# Patient Record
Sex: FEMALE | Race: WHITE | NOT HISPANIC OR LATINO
[De-identification: names, ages, dates, MRNs, and addresses within clinical notes are randomized per-mention and may not be internally consistent; named-entity substitution may affect disease eponyms.]

---

## 2017-12-29 ENCOUNTER — HOSPITAL (OUTPATIENT)
Dept: OTHER | Age: 52
End: 2017-12-29
Attending: RADIOLOGY

## 2018-12-18 PROCEDURE — 88175 CYTOPATH C/V AUTO FLUID REDO: CPT | Performed by: INTERNAL MEDICINE

## 2019-05-22 PROCEDURE — 88305 TISSUE EXAM BY PATHOLOGIST: CPT | Performed by: OBSTETRICS & GYNECOLOGY

## 2020-10-26 ENCOUNTER — HOSPITAL ENCOUNTER (OUTPATIENT)
Age: 55
Discharge: HOME | End: 2020-10-26
Payer: COMMERCIAL

## 2020-10-26 VITALS
DIASTOLIC BLOOD PRESSURE: 71 MMHG | WEIGHT: 195 LBS | HEART RATE: 88 BPM | BODY MASS INDEX: 41 KG/M2 | TEMPERATURE: 98 F | SYSTOLIC BLOOD PRESSURE: 176 MMHG | OXYGEN SATURATION: 100 % | RESPIRATION RATE: 17 BRPM

## 2020-10-26 DIAGNOSIS — Z20.822 ENCOUNTER FOR LABORATORY TESTING FOR COVID-19 VIRUS: ICD-10-CM

## 2020-10-26 DIAGNOSIS — R09.81 SINUS CONGESTION: Primary | ICD-10-CM

## 2020-10-26 PROCEDURE — 99203 OFFICE O/P NEW LOW 30 MIN: CPT | Performed by: NURSE PRACTITIONER

## 2020-10-26 PROCEDURE — U0003 INFECTIOUS AGENT DETECTION BY NUCLEIC ACID (DNA OR RNA); SEVERE ACUTE RESPIRATORY SYNDROME CORONAVIRUS 2 (SARS-COV-2) (CORONAVIRUS DISEASE [COVID-19]), AMPLIFIED PROBE TECHNIQUE, MAKING USE OF HIGH THROUGHPUT TECHNOLOGIES AS DESCRIBED BY CMS-2020-01-R: HCPCS | Performed by: NURSE PRACTITIONER

## 2020-10-26 RX ORDER — DOXYCYCLINE HYCLATE 100 MG/1
100 CAPSULE ORAL 2 TIMES DAILY
Qty: 14 CAPSULE | Refills: 0 | Status: SHIPPED | OUTPATIENT
Start: 2020-10-26 | End: 2020-11-02

## 2020-10-27 NOTE — ED PROVIDER NOTES
Patient Seen in: [facility]    History   CC: sinus pressure  HPI: [de-identified] 53year old female  who presents c/o right sided sinus pressure and some congestion x2-3 days. No swelling. No fever. No sore throat.  No cough, sob, darlyn, loss amLODIPine Besylate 5 MG Oral Tab,  Take 1 tablet (5 mg total) by mouth daily. ALPRAZolam 0.5 MG Oral Tab,  Take 1 tablet (0.5 mg total) by mouth 2 (two) times daily as needed for Anxiety.    SUMAtriptan Succinate 50 MG Oral Tab,  Take 1 tablet (50 mg tot s/s at any point in the next 1-3 days, may initiate. Advised on sinus congestion and sinusitis ins including sinus rinses. Unable to take Flonase d/t steroid allergy per pt. Covid19 test pending. Will notify w/ results.  Generalized Covid19 and viral illnes

## 2021-01-30 ENCOUNTER — HOSPITAL ENCOUNTER (OUTPATIENT)
Age: 56
Discharge: HOME | End: 2021-01-30
Attending: PHYSICIAN ASSISTANT
Payer: COMMERCIAL

## 2021-01-30 VITALS
SYSTOLIC BLOOD PRESSURE: 159 MMHG | TEMPERATURE: 98 F | RESPIRATION RATE: 16 BRPM | HEART RATE: 83 BPM | OXYGEN SATURATION: 100 % | DIASTOLIC BLOOD PRESSURE: 70 MMHG

## 2021-01-30 DIAGNOSIS — R03.0 ELEVATED BLOOD PRESSURE READING: ICD-10-CM

## 2021-01-30 DIAGNOSIS — R21 RASH: Primary | ICD-10-CM

## 2021-01-30 PROCEDURE — 99203 OFFICE O/P NEW LOW 30 MIN: CPT | Performed by: PHYSICIAN ASSISTANT

## 2021-01-30 RX ORDER — DIPHENHYDRAMINE HCL 25 MG
CAPSULE ORAL
Qty: 40 CAPSULE | Refills: 0 | Status: SHIPPED | OUTPATIENT
Start: 2021-01-30 | End: 2021-02-04

## 2021-01-30 RX ORDER — FAMOTIDINE 20 MG/1
20 TABLET ORAL 2 TIMES DAILY
Qty: 10 TABLET | Refills: 0 | Status: SHIPPED | OUTPATIENT
Start: 2021-01-30 | End: 2021-02-15

## 2021-01-30 RX ORDER — EPINEPHRINE 0.3 MG/.3ML
0.3 INJECTION SUBCUTANEOUS
Qty: 1 EACH | Refills: 0 | Status: SHIPPED | OUTPATIENT
Start: 2021-01-30 | End: 2021-03-01

## 2021-01-30 RX ORDER — PREDNISONE 20 MG/1
20 TABLET ORAL DAILY
Qty: 5 TABLET | Refills: 0 | Status: SHIPPED | OUTPATIENT
Start: 2021-01-30 | End: 2021-02-15

## 2021-01-30 NOTE — ED INITIAL ASSESSMENT (HPI)
Pt states that she started breaking out in a rash 10 days ago after taking a bath with epsom salt and lavender. Has been taking Benadryl. Triamcinolone cream and Allegra as prescribed by her PCP since that time. Rash continues to spread on body.  Denies dif

## 2021-01-30 NOTE — ED PROVIDER NOTES
Patient Seen in: [facility]    History   Patient presents with:  Skin Problem    Stated Complaint: Allergic Reaction    HPI    Jasmin Kamla is a 63year old female who presents with chief complaint of rash. Onset 2 days ago.   Rash is times daily as needed. amLODIPine Besylate 5 MG Oral Tab,  Take 1 tablet (5 mg total) by mouth daily. ALPRAZolam 0.5 MG Oral Tab,  Take 1 tablet (0.5 mg total) by mouth 2 (two) times daily as needed for Anxiety.        Family History   Problem Relation angioedema. Neck: The neck is supple. There is no evidence of JVD. No meningeal signs. Chest: There is no tenderness to the chest wall. Respiratory: Respiratory effort was normal.  There is no rales, wheezes, or rhonchi. There is no stridor.   Air ent of their elevated blood pressure reading at immediate care. They were informed of the dangers of undiagnosed and untreated hypertension.   Education regarding lifestyle modifications and the need for appropriate follow-up with their PCP to have their blood

## 2021-02-10 PROCEDURE — 88305 TISSUE EXAM BY PATHOLOGIST: CPT | Performed by: OBSTETRICS & GYNECOLOGY

## 2021-08-03 PROBLEM — Z63.4 BEREAVEMENT: Status: ACTIVE | Noted: 2021-08-03

## 2021-08-03 PROBLEM — I10 ESSENTIAL HYPERTENSION: Status: ACTIVE | Noted: 2021-08-03

## 2021-08-03 PROBLEM — E66.01 OBESITY, MORBID (HCC): Status: ACTIVE | Noted: 2021-08-03

## 2021-11-03 NOTE — H&P (VIEW-ONLY)
Pre-Operative History and Physical    Diagnosis:postmenopausal bleeding    Planned Procedure: hysterectomy, D&C     HPI:  Marie Clark is a 61year old  Patient's last menstrual period was 2019 (approximate).  who presents for evaluation o No        Sleep Concern: No        Stress Concern: No        Weight Concern: Yes        Special Diet: Yes        Back Care: Not Asked        Exercise: Yes          treadmill 3-4x/week 30-45 min        Bike Helmet: Not Asked        Seat Belt: Yes        Ruth pain, diarrhea or constiptation  Genitourinary: negative; denies dysuria, incontinence, vaginal itching or discharge. Musculoskeletal: negative; denies back pain. Skin/Breast: negative; Denies any breast pain, lumps, or discharge.   Denies any skin lesi

## 2021-11-17 ENCOUNTER — LAB ENCOUNTER (OUTPATIENT)
Dept: LAB | Facility: HOSPITAL | Age: 56
End: 2021-11-17
Attending: OBSTETRICS & GYNECOLOGY
Payer: COMMERCIAL

## 2021-11-17 DIAGNOSIS — Z01.818 PREOPERATIVE TESTING: ICD-10-CM

## 2021-11-19 ENCOUNTER — HOSPITAL ENCOUNTER (OUTPATIENT)
Facility: HOSPITAL | Age: 56
Setting detail: HOSPITAL OUTPATIENT SURGERY
Discharge: HOME | End: 2021-11-19
Attending: OBSTETRICS & GYNECOLOGY | Admitting: OBSTETRICS & GYNECOLOGY
Payer: COMMERCIAL

## 2021-11-19 ENCOUNTER — ANESTHESIA EVENT (OUTPATIENT)
Dept: SURGERY | Facility: HOSPITAL | Age: 56
End: 2021-11-19
Payer: COMMERCIAL

## 2021-11-19 ENCOUNTER — ANESTHESIA (OUTPATIENT)
Dept: SURGERY | Facility: HOSPITAL | Age: 56
End: 2021-11-19
Payer: COMMERCIAL

## 2021-11-19 VITALS
DIASTOLIC BLOOD PRESSURE: 54 MMHG | TEMPERATURE: 98 F | WEIGHT: 212 LBS | SYSTOLIC BLOOD PRESSURE: 113 MMHG | RESPIRATION RATE: 18 BRPM | HEIGHT: 57.5 IN | OXYGEN SATURATION: 99 % | HEART RATE: 50 BPM | BODY MASS INDEX: 45.11 KG/M2

## 2021-11-19 DIAGNOSIS — Z01.818 PREOPERATIVE TESTING: Primary | ICD-10-CM

## 2021-11-19 PROCEDURE — 88305 TISSUE EXAM BY PATHOLOGIST: CPT | Performed by: OBSTETRICS & GYNECOLOGY

## 2021-11-19 PROCEDURE — 0UDB7ZX EXTRACTION OF ENDOMETRIUM, VIA NATURAL OR ARTIFICIAL OPENING, DIAGNOSTIC: ICD-10-PCS | Performed by: OBSTETRICS & GYNECOLOGY

## 2021-11-19 PROCEDURE — 0UJD8ZZ INSPECTION OF UTERUS AND CERVIX, VIA NATURAL OR ARTIFICIAL OPENING ENDOSCOPIC: ICD-10-PCS | Performed by: OBSTETRICS & GYNECOLOGY

## 2021-11-19 RX ORDER — NALOXONE HYDROCHLORIDE 0.4 MG/ML
80 INJECTION, SOLUTION INTRAMUSCULAR; INTRAVENOUS; SUBCUTANEOUS AS NEEDED
Status: DISCONTINUED | OUTPATIENT
Start: 2021-11-19 | End: 2021-11-19

## 2021-11-19 RX ORDER — HALOPERIDOL 5 MG/ML
0.25 INJECTION INTRAMUSCULAR ONCE AS NEEDED
Status: DISCONTINUED | OUTPATIENT
Start: 2021-11-19 | End: 2021-11-19

## 2021-11-19 RX ORDER — MORPHINE SULFATE 10 MG/ML
6 INJECTION, SOLUTION INTRAMUSCULAR; INTRAVENOUS EVERY 10 MIN PRN
Status: DISCONTINUED | OUTPATIENT
Start: 2021-11-19 | End: 2021-11-19

## 2021-11-19 RX ORDER — ONDANSETRON 2 MG/ML
INJECTION INTRAMUSCULAR; INTRAVENOUS AS NEEDED
Status: DISCONTINUED | OUTPATIENT
Start: 2021-11-19 | End: 2021-11-19 | Stop reason: SURG

## 2021-11-19 RX ORDER — PROCHLORPERAZINE EDISYLATE 5 MG/ML
5 INJECTION INTRAMUSCULAR; INTRAVENOUS ONCE AS NEEDED
Status: DISCONTINUED | OUTPATIENT
Start: 2021-11-19 | End: 2021-11-19

## 2021-11-19 RX ORDER — ONDANSETRON 2 MG/ML
4 INJECTION INTRAMUSCULAR; INTRAVENOUS EVERY 8 HOURS PRN
Status: DISCONTINUED | OUTPATIENT
Start: 2021-11-19 | End: 2021-11-19

## 2021-11-19 RX ORDER — ONDANSETRON 2 MG/ML
4 INJECTION INTRAMUSCULAR; INTRAVENOUS ONCE AS NEEDED
Status: DISCONTINUED | OUTPATIENT
Start: 2021-11-19 | End: 2021-11-19

## 2021-11-19 RX ORDER — ACETAMINOPHEN 325 MG/1
650 TABLET ORAL EVERY 6 HOURS PRN
Status: DISCONTINUED | OUTPATIENT
Start: 2021-11-19 | End: 2021-11-19

## 2021-11-19 RX ORDER — ONDANSETRON 4 MG/1
4 TABLET, FILM COATED ORAL EVERY 8 HOURS PRN
Status: DISCONTINUED | OUTPATIENT
Start: 2021-11-19 | End: 2021-11-19

## 2021-11-19 RX ORDER — HYDROMORPHONE HYDROCHLORIDE 1 MG/ML
0.2 INJECTION, SOLUTION INTRAMUSCULAR; INTRAVENOUS; SUBCUTANEOUS EVERY 5 MIN PRN
Status: DISCONTINUED | OUTPATIENT
Start: 2021-11-19 | End: 2021-11-19

## 2021-11-19 RX ORDER — METOCLOPRAMIDE 10 MG/1
10 TABLET ORAL ONCE
Status: COMPLETED | OUTPATIENT
Start: 2021-11-19 | End: 2021-11-19

## 2021-11-19 RX ORDER — HYDROMORPHONE HYDROCHLORIDE 1 MG/ML
0.6 INJECTION, SOLUTION INTRAMUSCULAR; INTRAVENOUS; SUBCUTANEOUS EVERY 5 MIN PRN
Status: DISCONTINUED | OUTPATIENT
Start: 2021-11-19 | End: 2021-11-19

## 2021-11-19 RX ORDER — ACETAMINOPHEN 500 MG
1000 TABLET ORAL ONCE
Status: COMPLETED | OUTPATIENT
Start: 2021-11-19 | End: 2021-11-19

## 2021-11-19 RX ORDER — MORPHINE SULFATE 4 MG/ML
2 INJECTION, SOLUTION INTRAMUSCULAR; INTRAVENOUS EVERY 10 MIN PRN
Status: DISCONTINUED | OUTPATIENT
Start: 2021-11-19 | End: 2021-11-19

## 2021-11-19 RX ORDER — HYDROCODONE BITARTRATE AND ACETAMINOPHEN 5; 325 MG/1; MG/1
2 TABLET ORAL AS NEEDED
Status: DISCONTINUED | OUTPATIENT
Start: 2021-11-19 | End: 2021-11-19

## 2021-11-19 RX ORDER — FAMOTIDINE 20 MG/1
20 TABLET ORAL ONCE
Status: COMPLETED | OUTPATIENT
Start: 2021-11-19 | End: 2021-11-19

## 2021-11-19 RX ORDER — MORPHINE SULFATE 4 MG/ML
4 INJECTION, SOLUTION INTRAMUSCULAR; INTRAVENOUS EVERY 10 MIN PRN
Status: DISCONTINUED | OUTPATIENT
Start: 2021-11-19 | End: 2021-11-19

## 2021-11-19 RX ORDER — SODIUM CHLORIDE, SODIUM LACTATE, POTASSIUM CHLORIDE, CALCIUM CHLORIDE 600; 310; 30; 20 MG/100ML; MG/100ML; MG/100ML; MG/100ML
INJECTION, SOLUTION INTRAVENOUS CONTINUOUS
Status: DISCONTINUED | OUTPATIENT
Start: 2021-11-19 | End: 2021-11-19

## 2021-11-19 RX ORDER — HYDROCODONE BITARTRATE AND ACETAMINOPHEN 5; 325 MG/1; MG/1
2 TABLET ORAL EVERY 6 HOURS PRN
Status: DISCONTINUED | OUTPATIENT
Start: 2021-11-19 | End: 2021-11-19

## 2021-11-19 RX ORDER — HYDROCODONE BITARTRATE AND ACETAMINOPHEN 5; 325 MG/1; MG/1
1 TABLET ORAL AS NEEDED
Status: DISCONTINUED | OUTPATIENT
Start: 2021-11-19 | End: 2021-11-19

## 2021-11-19 RX ORDER — HYDROCODONE BITARTRATE AND ACETAMINOPHEN 5; 325 MG/1; MG/1
1 TABLET ORAL EVERY 6 HOURS PRN
Status: DISCONTINUED | OUTPATIENT
Start: 2021-11-19 | End: 2021-11-19

## 2021-11-19 RX ORDER — HYDROMORPHONE HYDROCHLORIDE 1 MG/ML
0.4 INJECTION, SOLUTION INTRAMUSCULAR; INTRAVENOUS; SUBCUTANEOUS EVERY 5 MIN PRN
Status: DISCONTINUED | OUTPATIENT
Start: 2021-11-19 | End: 2021-11-19

## 2021-11-19 RX ORDER — IBUPROFEN 600 MG/1
600 TABLET ORAL EVERY 6 HOURS
Status: DISCONTINUED | OUTPATIENT
Start: 2021-11-19 | End: 2021-11-19

## 2021-11-19 RX ADMIN — SODIUM CHLORIDE, SODIUM LACTATE, POTASSIUM CHLORIDE, CALCIUM CHLORIDE: 600; 310; 30; 20 INJECTION, SOLUTION INTRAVENOUS at 14:25:00

## 2021-11-19 RX ADMIN — ONDANSETRON 4 MG: 2 INJECTION INTRAMUSCULAR; INTRAVENOUS at 14:07:00

## 2021-11-19 NOTE — ANESTHESIA PREPROCEDURE EVALUATION
Anesthesia PreOp Note    HPI:     Adam Chattereje is a 61year old female who presents for preoperative consultation requested by: Richard Monzon MD    Date of Surgery: 11/19/2021    Procedure(s):  hysteroscopy, dilation and curettage  Indication: post besylate 5 MG Oral Tab, Take 1 tablet (5 mg total) by mouth daily. , Disp: 90 tablet, Rfl: 3, 11/19/2021 at Unknown time      lactated ringers infusion, , Intravenous, Continuous, Mitchel ELLIS MD, Last Rate: 20 mL/hr at 11/19/21 1237, New Bag at 11/19/2 Seat Belt: Yes        Self-Exams: Yes    Social History Narrative      Lives with  and 2 girls     Social Determinants of Health  Financial Resource Strain: Not on file  Food Insecurity: Not on file  Transportation Needs: Not on file  Physical Ac normal; there are no regional wall motion abnormalities.      Left ventricular diastolic function parameters are normal.     Impressions:     - Normal study.   - No previous study was available for comparison.      Anesthesia Plan:   ASA:  3  Plan:   Stephanie Mora

## 2021-11-19 NOTE — DISCHARGE SUMMARY
[facility] HOSP - [facility]    Discharge Summary    [facility] Patient Status:  Hospital Outpatient Surgery    10/29/1965 MRN [id number]   Location [facility] 45 Attending Alicia Monique MD   Hosp Day # 0 PCP Caterina Bowman, · That you have someone stay with you on your first night home   · Do not drink alcohol or take sleeping pills or tranquilizers   · Do not sign legal documents within 24 hours of your procedure   · If you had a nerve block for your surgery, take extra ca

## 2021-11-19 NOTE — ANESTHESIA POSTPROCEDURE EVALUATION
Patient: Mik Graham    Procedure Summary     Date: 11/19/21 Room / Location: [address] [place] [address]    Anesthesia Start: 9973 Anesthesia Stop: 5587    Procedure: hysteroscopy, dilation and curettage (N/A ) Diagnosis: (post menopausal bleeding)

## 2021-11-19 NOTE — BRIEF OP NOTE
Pre-Operative Diagnosis: post menopausal bleeding     Post-Operative Diagnosis: post menopausal bleeding      Procedure Performed:   hysteroscopy, dilation and curettage    Surgeon(s) and Role:     Clifton Erickson MD - Primary    Assistant(s):        Gaby Damian

## 2021-11-19 NOTE — INTERVAL H&P NOTE
Pre-op Diagnosis: post menopausal bleeding    The above referenced H&P was reviewed by Arelis Nieves MD on 11/19/2021, the patient was examined and no significant changes have occurred in the patient's condition since the H&P was performed.   I discussed

## 2021-11-19 NOTE — ANESTHESIA PROCEDURE NOTES
Airway  Date/Time: 11/19/2021 1:54 PM  Urgency: Elective      General Information and Staff    Patient location during procedure: OR  Anesthesiologist: Maximo Bucio MD  Performed: anesthesiologist     Indications and Patient Condition  Indicati

## 2021-11-19 NOTE — OPERATIVE REPORT
Pre-Operative Diagnosis: post menopausal bleeding     Post-Operative Diagnosis: post menopausal bleeding      Procedure Performed:   hysteroscopy, dilation and curettage    Surgeon(s) and Role:     Sherman Vergara MD - Primary    Assistant(s):        Nona Pizano

## 2023-01-10 ENCOUNTER — HOSPITAL ENCOUNTER (OUTPATIENT)
Age: 58
Discharge: HOME | End: 2023-01-10
Payer: COMMERCIAL

## 2023-01-10 VITALS
TEMPERATURE: 98 F | RESPIRATION RATE: 20 BRPM | DIASTOLIC BLOOD PRESSURE: 68 MMHG | HEART RATE: 82 BPM | SYSTOLIC BLOOD PRESSURE: 148 MMHG | OXYGEN SATURATION: 98 %

## 2023-01-10 DIAGNOSIS — J01.90 ACUTE SINUSITIS, RECURRENCE NOT SPECIFIED, UNSPECIFIED LOCATION: Primary | ICD-10-CM

## 2023-01-10 PROCEDURE — 99213 OFFICE O/P EST LOW 20 MIN: CPT | Performed by: NURSE PRACTITIONER

## 2023-01-10 NOTE — ED INITIAL ASSESSMENT (HPI)
Patient is here with \"nasal congestion and head pressure\" that she states has been going on for a least a week and a half. She has been on two antibiotics for the same. Three covid tests were taken and all negative.

## 2023-08-23 ENCOUNTER — TELEPHONE (OUTPATIENT)
Dept: OTHER | Facility: HOSPITAL | Age: 58
End: 2023-08-23

## 2023-08-23 NOTE — PROGRESS NOTES
Patient is schedule for the Heart Scan on 8/30 at [facility].  Patient completed the Heart Scan on 10/28/2021 with [facility]. And her calcium score was 0.0. LVM to discuss with patient as to reason for repeating so soon. Left our RN line to call back.

## 2023-11-15 ENCOUNTER — HOSPITAL ENCOUNTER (OUTPATIENT)
Age: 58
Discharge: HOME | End: 2023-11-15
Payer: COMMERCIAL

## 2023-11-15 VITALS
RESPIRATION RATE: 18 BRPM | OXYGEN SATURATION: 97 % | TEMPERATURE: 100 F | HEART RATE: 93 BPM | DIASTOLIC BLOOD PRESSURE: 83 MMHG | SYSTOLIC BLOOD PRESSURE: 153 MMHG

## 2023-11-15 DIAGNOSIS — L03.114 CELLULITIS OF LEFT UPPER EXTREMITY: Primary | ICD-10-CM

## 2023-11-15 DIAGNOSIS — R21 RASH: ICD-10-CM

## 2023-11-15 LAB — S PYO AG THROAT QL: NEGATIVE

## 2023-11-15 PROCEDURE — 99213 OFFICE O/P EST LOW 20 MIN: CPT | Performed by: EMERGENCY MEDICINE

## 2023-11-15 PROCEDURE — 87880 STREP A ASSAY W/OPTIC: CPT | Performed by: EMERGENCY MEDICINE

## 2023-11-15 RX ORDER — KETOCONAZOLE 20 MG/G
CREAM TOPICAL
Qty: 30 G | Refills: 0 | Status: SHIPPED | OUTPATIENT
Start: 2023-11-15

## 2023-11-15 RX ORDER — CEPHALEXIN 500 MG/1
500 CAPSULE ORAL 2 TIMES DAILY
Qty: 14 CAPSULE | Refills: 0 | Status: SHIPPED | OUTPATIENT
Start: 2023-11-15 | End: 2023-11-22

## 2023-11-15 NOTE — DISCHARGE INSTRUCTIONS
As we discussed your symptoms are consistent with cellulitis, but the other patch area can be dermatitis related to the vaccine, or ringworm. Ketoconazole was sent to the pharmacy to take as directed. Keflex antibiotic was sent to the pharmacy to help treat with the mild cellulitis. Upper respiratory symptoms as we discussed are likely viral, and there is no evidence of ear infection. In addition to the current regimen please take a 24-hour Zyrtec/cetirizine, or Xyzal/levocetirizine during the day, and 25 to 50 mg of Benadryl at nighttime. Cool compress to the left arm, and left armpit. There is no contraindication of taking ibuprofen type medications you can take ibuprofen every 6-8 hours as needed for inflammation. This is the same medication as Motrin/Advil  Call your primary for not better within the next 7 to 10 days.   Go to the emergency room for any worsening symptoms

## 2023-11-15 NOTE — ED INITIAL ASSESSMENT (HPI)
Pt states received tetanus 6 days ago to L arm. Pt c/o L arm pain, swelling, redness and itching to injection site and L axilla pain x6 days. Pt adds c/o sore thorat, headache, B ear pain, cough x4 days. Tmax 100.4 last night.

## 2023-11-17 ENCOUNTER — HOSPITAL ENCOUNTER (OUTPATIENT)
Age: 58
Discharge: HOME | End: 2023-11-17
Payer: COMMERCIAL

## 2023-11-17 ENCOUNTER — APPOINTMENT (OUTPATIENT)
Dept: GENERAL RADIOLOGY | Age: 58
End: 2023-11-17
Attending: PHYSICIAN ASSISTANT
Payer: COMMERCIAL

## 2023-11-17 VITALS
SYSTOLIC BLOOD PRESSURE: 154 MMHG | OXYGEN SATURATION: 97 % | DIASTOLIC BLOOD PRESSURE: 76 MMHG | HEART RATE: 88 BPM | RESPIRATION RATE: 16 BRPM | TEMPERATURE: 100 F

## 2023-11-17 DIAGNOSIS — R50.9 FEVER, UNSPECIFIED FEVER CAUSE: Primary | ICD-10-CM

## 2023-11-17 DIAGNOSIS — J18.9 PNEUMONIA OF RIGHT UPPER LOBE DUE TO INFECTIOUS ORGANISM: ICD-10-CM

## 2023-11-17 PROCEDURE — 99204 OFFICE O/P NEW MOD 45 MIN: CPT | Performed by: PHYSICIAN ASSISTANT

## 2023-11-17 PROCEDURE — 71046 X-RAY EXAM CHEST 2 VIEWS: CPT | Performed by: PHYSICIAN ASSISTANT

## 2023-11-17 RX ORDER — AZITHROMYCIN 250 MG/1
TABLET, FILM COATED ORAL
Qty: 6 TABLET | Refills: 0 | Status: SHIPPED | OUTPATIENT
Start: 2023-11-17 | End: 2023-11-22

## 2023-11-17 RX ORDER — CEFPODOXIME PROXETIL 200 MG/1
200 TABLET, FILM COATED ORAL 2 TIMES DAILY
Qty: 14 TABLET | Refills: 0 | Status: SHIPPED | OUTPATIENT
Start: 2023-11-17 | End: 2023-11-24

## 2023-11-17 RX ORDER — ALBUTEROL SULFATE 90 UG/1
2 AEROSOL, METERED RESPIRATORY (INHALATION) EVERY 4 HOURS PRN
Qty: 1 EACH | Refills: 0 | Status: SHIPPED | OUTPATIENT
Start: 2023-11-17 | End: 2023-12-17

## 2023-11-17 NOTE — ED INITIAL ASSESSMENT (HPI)
Patient comes in states she was here on Tuesday swab for covid at home was neg, and strep her was neg. Here again because continues to have a low grade fever 99.0's, feels like she has to clear her throat with coughing, ears feel like they are burning, fatigue.  [id number]

## 2024-01-29 ENCOUNTER — HOSPITAL ENCOUNTER (OUTPATIENT)
Age: 59
Discharge: ANOTHER HOSPITAL | End: 2024-01-29
Payer: COMMERCIAL

## 2024-01-29 ENCOUNTER — APPOINTMENT (OUTPATIENT)
Dept: GENERAL RADIOLOGY | Age: 59
End: 2024-01-29
Attending: NURSE PRACTITIONER
Payer: COMMERCIAL

## 2024-01-29 ENCOUNTER — HOSPITAL ENCOUNTER (EMERGENCY)
Facility: HOSPITAL | Age: 59
Discharge: HOME | End: 2024-01-29
Attending: EMERGENCY MEDICINE
Payer: COMMERCIAL

## 2024-01-29 VITALS
TEMPERATURE: 98 F | RESPIRATION RATE: 22 BRPM | OXYGEN SATURATION: 98 % | SYSTOLIC BLOOD PRESSURE: 121 MMHG | HEART RATE: 69 BPM | BODY MASS INDEX: 42.33 KG/M2 | WEIGHT: 210 LBS | DIASTOLIC BLOOD PRESSURE: 55 MMHG | HEIGHT: 59 IN

## 2024-01-29 VITALS
HEART RATE: 86 BPM | OXYGEN SATURATION: 100 % | RESPIRATION RATE: 18 BRPM | DIASTOLIC BLOOD PRESSURE: 67 MMHG | SYSTOLIC BLOOD PRESSURE: 146 MMHG | TEMPERATURE: 98 F

## 2024-01-29 DIAGNOSIS — S29.012A STRAIN OF THORACIC BACK REGION: Primary | ICD-10-CM

## 2024-01-29 DIAGNOSIS — M54.9 UPPER BACK PAIN ON LEFT SIDE: Primary | ICD-10-CM

## 2024-01-29 DIAGNOSIS — K30 INDIGESTION: ICD-10-CM

## 2024-01-29 DIAGNOSIS — R79.89 ELEVATED TROPONIN: ICD-10-CM

## 2024-01-29 LAB
#MXD IC: 0.9 X10ˆ3/UL (ref 0.1–1)
ANION GAP SERPL CALC-SCNC: 5 MMOL/L (ref 0–18)
ATRIAL RATE: 77 BPM
BASOPHILS # BLD AUTO: 0.06 X10(3) UL (ref 0–0.2)
BASOPHILS # BLD AUTO: 0.08 X10(3) UL (ref 0–0.2)
BASOPHILS NFR BLD AUTO: 0.6 %
BASOPHILS NFR BLD AUTO: 0.8 %
BUN BLD-MCNC: 13 MG/DL (ref 9–23)
BUN BLD-MCNC: 16 MG/DL (ref 7–18)
BUN/CREAT SERPL: 15.3 (ref 10–20)
CALCIUM BLD-MCNC: 9.2 MG/DL (ref 8.7–10.4)
CHLORIDE BLD-SCNC: 108 MMOL/L (ref 98–112)
CHLORIDE SERPL-SCNC: 109 MMOL/L (ref 98–112)
CO2 BLD-SCNC: 24 MMOL/L (ref 21–32)
CO2 SERPL-SCNC: 28 MMOL/L (ref 21–32)
CREAT BLD-MCNC: 0.8 MG/DL
CREAT BLD-MCNC: 0.85 MG/DL
DDIMER WHOLE BLOOD: <200 NG/ML DDU (ref ?–400)
DEPRECATED RDW RBC AUTO: 45.1 FL (ref 35.1–46.3)
DEPRECATED RDW RBC AUTO: 47.9 FL (ref 35.1–46.3)
EGFRCR SERPLBLD CKD-EPI 2021: 79 ML/MIN/1.73M2 (ref 60–?)
EGFRCR SERPLBLD CKD-EPI 2021: 85 ML/MIN/1.73M2 (ref 60–?)
EOSINOPHIL # BLD AUTO: 0.3 X10(3) UL (ref 0–0.7)
EOSINOPHIL # BLD AUTO: 0.32 X10(3) UL (ref 0–0.7)
EOSINOPHIL NFR BLD AUTO: 3.1 %
EOSINOPHIL NFR BLD AUTO: 3.2 %
ERYTHROCYTE [DISTWIDTH] IN BLOOD BY AUTOMATED COUNT: 13.8 % (ref 11–15)
ERYTHROCYTE [DISTWIDTH] IN BLOOD BY AUTOMATED COUNT: 13.8 % (ref 11–15)
GLUCOSE BLD-MCNC: 116 MG/DL (ref 70–99)
GLUCOSE BLD-MCNC: 91 MG/DL (ref 70–99)
HCT VFR BLD AUTO: 38.4 %
HCT VFR BLD AUTO: 40.7 %
HCT VFR BLD AUTO: 41.6 %
HCT VFR BLD CALC: 41 %
HGB BLD-MCNC: 12.1 G/DL
HGB BLD-MCNC: 12.5 G/DL
HGB BLD-MCNC: 13.2 G/DL
IMM GRANULOCYTES # BLD AUTO: 0.02 X10(3) UL (ref 0–1)
IMM GRANULOCYTES # BLD AUTO: 0.03 X10(3) UL (ref 0–1)
IMM GRANULOCYTES NFR BLD: 0.2 %
IMM GRANULOCYTES NFR BLD: 0.3 %
ISTAT IONIZED CALCIUM FOR CHEM 8: 0.94 MMOL/L (ref 1.12–1.32)
LYMPHOCYTES # BLD AUTO: 3.88 X10(3) UL (ref 1–4)
LYMPHOCYTES # BLD AUTO: 4.12 X10(3) UL (ref 1–4)
LYMPHOCYTES # BLD AUTO: 4.7 X10ˆ3/UL (ref 1–4)
LYMPHOCYTES NFR BLD AUTO: 39.5 %
LYMPHOCYTES NFR BLD AUTO: 41 %
LYMPHOCYTES NFR BLD AUTO: 41.7 %
MCH RBC QN AUTO: 28.1 PG (ref 26–34)
MCH RBC QN AUTO: 28.2 PG (ref 26–34)
MCH RBC QN AUTO: 28.4 PG (ref 26–34)
MCHC RBC AUTO-ENTMCNC: 30 G/DL (ref 31–37)
MCHC RBC AUTO-ENTMCNC: 31.5 G/DL (ref 31–37)
MCHC RBC AUTO-ENTMCNC: 32.4 G/DL (ref 31–37)
MCV RBC AUTO: 87.7 FL (ref 80–100)
MCV RBC AUTO: 89.5 FL
MCV RBC AUTO: 93.5 FL
MIXED CELL %: 7.6 %
MONOCYTES # BLD AUTO: 0.68 X10(3) UL (ref 0.1–1)
MONOCYTES # BLD AUTO: 0.84 X10(3) UL (ref 0.1–1)
MONOCYTES NFR BLD AUTO: 6.8 %
MONOCYTES NFR BLD AUTO: 8.6 %
NEUTROPHILS # BLD AUTO: 4.72 X10 (3) UL (ref 1.5–7.7)
NEUTROPHILS # BLD AUTO: 4.72 X10(3) UL (ref 1.5–7.7)
NEUTROPHILS # BLD AUTO: 4.81 X10 (3) UL (ref 1.5–7.7)
NEUTROPHILS # BLD AUTO: 4.81 X10(3) UL (ref 1.5–7.7)
NEUTROPHILS # BLD AUTO: 5.6 X10ˆ3/UL (ref 1.5–7.7)
NEUTROPHILS NFR BLD AUTO: 47.9 %
NEUTROPHILS NFR BLD AUTO: 48 %
NEUTROPHILS NFR BLD AUTO: 50.7 %
OSMOLALITY SERPL CALC.SUM OF ELEC: 294 MOSM/KG (ref 275–295)
P AXIS: 56 DEGREES
P-R INTERVAL: 190 MS
PLATELET # BLD AUTO: 183 10(3)UL (ref 150–450)
PLATELET # BLD AUTO: 375 10(3)UL (ref 150–450)
POTASSIUM BLD-SCNC: 4.7 MMOL/L (ref 3.6–5.1)
POTASSIUM SERPL-SCNC: 3.5 MMOL/L (ref 3.5–5.1)
Q-T INTERVAL: 396 MS
QRS DURATION: 88 MS
QTC CALCULATION (BEZET): 448 MS
R AXIS: 48 DEGREES
RBC # BLD AUTO: 4.29 X10(6)UL
RBC # BLD AUTO: 4.45 X10(6)UL
RBC # BLD AUTO: 4.64 X10ˆ6/UL
SODIUM BLD-SCNC: 140 MMOL/L (ref 136–145)
SODIUM SERPL-SCNC: 142 MMOL/L (ref 136–145)
T AXIS: 45 DEGREES
TROPONIN I BLD-MCNC: 0.08 NG/ML
TROPONIN I SERPL HS-MCNC: 31 NG/L
VENTRICULAR RATE: 77 BPM
WBC # BLD AUTO: 10 X10(3) UL (ref 4–11)
WBC # BLD AUTO: 11.2 X10ˆ3/UL (ref 4–11)
WBC # BLD AUTO: 9.8 X10(3) UL (ref 4–11)

## 2024-01-29 PROCEDURE — 93000 ELECTROCARDIOGRAM COMPLETE: CPT | Performed by: NURSE PRACTITIONER

## 2024-01-29 PROCEDURE — 99284 EMERGENCY DEPT VISIT MOD MDM: CPT

## 2024-01-29 PROCEDURE — 80048 BASIC METABOLIC PNL TOTAL CA: CPT | Performed by: EMERGENCY MEDICINE

## 2024-01-29 PROCEDURE — 93005 ELECTROCARDIOGRAM TRACING: CPT

## 2024-01-29 PROCEDURE — 84484 ASSAY OF TROPONIN QUANT: CPT | Performed by: NURSE PRACTITIONER

## 2024-01-29 PROCEDURE — 80047 BASIC METABLC PNL IONIZED CA: CPT | Performed by: NURSE PRACTITIONER

## 2024-01-29 PROCEDURE — 71046 X-RAY EXAM CHEST 2 VIEWS: CPT | Performed by: NURSE PRACTITIONER

## 2024-01-29 PROCEDURE — 85025 COMPLETE CBC W/AUTO DIFF WBC: CPT | Performed by: NURSE PRACTITIONER

## 2024-01-29 PROCEDURE — 99214 OFFICE O/P EST MOD 30 MIN: CPT | Performed by: NURSE PRACTITIONER

## 2024-01-29 PROCEDURE — 84484 ASSAY OF TROPONIN QUANT: CPT | Performed by: EMERGENCY MEDICINE

## 2024-01-29 PROCEDURE — 36415 COLL VENOUS BLD VENIPUNCTURE: CPT

## 2024-01-29 PROCEDURE — 93010 ELECTROCARDIOGRAM REPORT: CPT

## 2024-01-29 RX ORDER — MAGNESIUM HYDROXIDE/ALUMINUM HYDROXICE/SIMETHICONE 120; 1200; 1200 MG/30ML; MG/30ML; MG/30ML
30 SUSPENSION ORAL ONCE
Status: COMPLETED | OUTPATIENT
Start: 2024-01-29 | End: 2024-01-29

## 2024-01-29 RX ORDER — ASPIRIN 81 MG/1
81 TABLET, CHEWABLE ORAL ONCE
Status: COMPLETED | OUTPATIENT
Start: 2024-01-29 | End: 2024-01-29

## 2024-01-29 RX ORDER — DICYCLOMINE HYDROCHLORIDE 10 MG/5ML
20 SOLUTION ORAL ONCE
Status: COMPLETED | OUTPATIENT
Start: 2024-01-29 | End: 2024-01-29

## 2024-01-29 NOTE — ED QUICK NOTES
Patient verbalized understanding to go to ER. No IV in place at this time. Patient ambulated out of IC with steady gait without further incident.

## 2024-01-29 NOTE — ED PROVIDER NOTES
Patient Seen in: [facility]      History     Chief Complaint   Patient presents with    Back Pain     Entered by patient    Shoulder Pain     Stated Complaint: Back Pain    Subjective:   HPI    58-year-old female, with history of hypertension, gallstones, diabetes, presents with sharp deep left upper back pain that is coming and going since Tuesday.  Pain is not worse with movement.  Reports feeling bloated with worsening indigestion.  Occasionally the pain moves from the epigastric region to the shoulder.  Pain is worse with lying flat and improves with ibuprofen.  No trauma or injury.  No shortness of breath.  No recent travel.  She does not smoke.    Objective:   Past Medical History:   Diagnosis Date    Anxiety state     Essential hypertension     GI DISORDER     gallstones    High blood pressure     PONV (postoperative nausea and vomiting)     Prediabetes     Sinusitis     Visual impairment     eyeglasses              Past Surgical History:   Procedure Laterality Date      , 2001    x2    CHOLECYSTECTOMY  2012    COLONOSCOPY      OTHER SURGICAL HISTORY      lipoma on back    OTHER SURGICAL HISTORY      bunionectomy x 2 -5th toes    TONSILLECTOMY                  Social History     Socioeconomic History    Marital status:    Occupational History    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   Tobacco Use    Smoking status: Never    Smokeless tobacco: Never   Vaping Use    Vaping Use: Never used   Substance and Sexual Activity    Alcohol use: Not Currently    Drug use: No    Sexual activity: Yes     Partners: Male     Birth control/protection: Vasectomy   Other Topics Concern     Service No    Blood Transfusions No    Caffeine Concern Yes     Comment: COFFEE 1 CUP EVERY OTHER DAY    Occupational Exposure No    Hobby Hazards No    Sleep Concern No    Stress Concern No    Weight Concern Yes    Special Diet Yes    Exercise Yes     Comment: treadmill 3-4x/week 30-45 min    Seat Belt  Yes    Self-Exams Yes   Social History Narrative    Lives with  and 2 girls               Review of Systems    Positive for stated complaint: Back Pain  Other systems are as noted in HPI.  Constitutional and vital signs reviewed.      All other systems reviewed and negative except as noted above.    Physical Exam     ED Triage Vitals [01/29/24 1626]   /67   Pulse 86   Resp 18   Temp 97.8 °F (36.6 °C)   Temp src Oral   SpO2 100 %   O2 Device None (Room air)       Current:/67   Pulse 86   Temp 97.8 °F (36.6 °C) (Oral)   Resp 18   LMP 01/14/2019 (Approximate)   SpO2 100%         Physical Exam  Vitals and nursing note reviewed.   Constitutional:       Appearance: Normal appearance.   Cardiovascular:      Rate and Rhythm: Normal rate and regular rhythm.      Pulses: Normal pulses.      Heart sounds: Normal heart sounds.   Pulmonary:      Effort: Pulmonary effort is normal.      Breath sounds: Normal breath sounds.   Musculoskeletal:         General: Normal range of motion.      Comments: There is no reproducible muscular tenderness on palpation of the left shoulder upper back   Neurological:      Mental Status: She is alert and oriented to person, place, and time.               ED Course     Labs Reviewed   POCT CBC - Abnormal; Notable for the following components:       Result Value    WBC IC 11.2 (*)     # Lymphocyte 4.7 (*)     All other components within normal limits   POCT ISTAT CHEM8 CARTRIDGE - Abnormal; Notable for the following components:    ISTAT Ionized Calcium 0.94 (*)     ISTAT Glucose 116 (*)     All other components within normal limits   ISTAT TROPONIN - Abnormal; Notable for the following components:    ISTAT Troponin 0.08 (*)     All other components within normal limits   D-DIMER (POC) - Normal   CBC W AUTO DIFF     EKG    Rate, intervals and axes as noted on EKG Report.  Rate: 77  Rhythm: Sinus Rhythm  Reading: No STEMI inferior Q waves                          MDM       58-year-old female presents with the above complaints left upper back shoulder and epigastric pain that is not reproducible no vomiting  Reflux, dyspepsia, musculoskeletal, gallbladder disease, cardiac pulmonary causes considered  CBC unremarkable CHEM calcium 0.94 glucose 116 otherwise unremarkable D-dimer is negative  Troponin is elevated 0.08  Chest x-ray negative previous right upper lobe pneumonia has resolved  Case discussed with Dr. Singh supervising physician baby aspirin administered patient will go to [place] emergency department for higher level of care                                 Medical Decision Making  Problems Addressed:  Elevated troponin: acute illness or injury with systemic symptoms that poses a threat to life or bodily functions  Indigestion: acute illness or injury  Upper back pain on left side: acute illness or injury    Amount and/or Complexity of Data Reviewed  External Data Reviewed: notes.  Labs: ordered. Decision-making details documented in ED Course.  Radiology: ordered and independent interpretation performed. Decision-making details documented in ED Course.  ECG/medicine tests: ordered. Decision-making details documented in ED Course.        Disposition and Plan     Clinical Impression:  1. Upper back pain on left side    2. Indigestion    3. Elevated troponin         Disposition:  Ic to ed  1/29/2024  5:31 pm    Follow-up:  No follow-up provider specified.        Medications Prescribed:  Discharge Medication List as of 1/29/2024  5:33 PM

## 2024-01-29 NOTE — ED INITIAL ASSESSMENT (HPI)
To ED with c/o left upper back pain. Patient states she went to IC and was told to come to ED for further updated. Patient states having an elevated troponin

## 2024-01-29 NOTE — ED INITIAL ASSESSMENT (HPI)
Patient reporting since last week Tuesday patient reporting sharp pain in left shoulder that is worse at night when sitting back on sofa. Pain is relieved with ibuprofen and prilosec. Patient also reporting increased gas and bloating.

## 2024-01-30 LAB
ATRIAL RATE: 72 BPM
P AXIS: 61 DEGREES
P-R INTERVAL: 186 MS
Q-T INTERVAL: 394 MS
QRS DURATION: 86 MS
QTC CALCULATION (BEZET): 431 MS
R AXIS: 44 DEGREES
T AXIS: 39 DEGREES
VENTRICULAR RATE: 72 BPM

## 2024-01-30 NOTE — ED PROVIDER NOTES
Patient Seen in: [facility] Emergency Department    History     Chief Complaint   Patient presents with    Abnormal Labs       HPI    58-year-old female with past medical history significant for anxiety, high blood pressure, prediabetes, presents to the ED for evaluation of pain in her left mid back.  Patient states that she has been having this discomfort for almost a week now and it is worse at nighttime.  She states that it wakes her up out of her sleep and feels like a sharp discomfort with spasms.  She has been taking ibuprofen with some relief.  She also tried taking some antacids which has not helped.  Patient denies any shortness of breath, nausea, palpitations, diaphoresis.  She was seen at urgent care and sent to the ED for further evaluation.    History from Independent Source:       External Records Reviewed: Reviewed external records and patient was seen in urgent care.  She had similar complaints and had a negative chest x-ray but her troponin was trace +0.08 without any acute EKG changes.    History reviewed.   Past Medical History:   Diagnosis Date    Anxiety state     Essential hypertension     GI DISORDER     gallstones    High blood pressure     PONV (postoperative nausea and vomiting)     Prediabetes     Sinusitis     Visual impairment     eyeglasses       History reviewed.   Past Surgical History:   Procedure Laterality Date      , 2001    x2    CHOLECYSTECTOMY  2012    COLONOSCOPY      OTHER SURGICAL HISTORY      lipoma on back    OTHER SURGICAL HISTORY      bunionectomy x 2 -5th toes    TONSILLECTOMY           Medications :  (Not in a hospital admission)       Family History   Problem Relation Age of Onset    Hypertension Father     Diabetes Father     Heart Disorder Father         chf    Diabetes Mother     Hypertension Mother     Migraines Mother     Hypertension Sister     Lipids Sister     Diabetes Sister 46    Cancer Sister 45        Cancer on arm, type of skin cancer     Hypertension Sister     Heart Disorder Maternal Grandmother     Hypertension Maternal Grandmother        Smoking Status:   Social History     Socioeconomic History    Marital status:    Occupational History    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   Tobacco Use    Smoking status: Never    Smokeless tobacco: Never   Vaping Use    Vaping Use: Never used   Substance and Sexual Activity    Alcohol use: Not Currently    Drug use: No    Sexual activity: Yes     Partners: Male     Birth control/protection: Vasectomy   Other Topics Concern     Service No    Blood Transfusions No    Caffeine Concern Yes     Comment: COFFEE 1 CUP EVERY OTHER DAY    Occupational Exposure No    Hobby Hazards No    Sleep Concern No    Stress Concern No    Weight Concern Yes    Special Diet Yes    Exercise Yes     Comment: treadmill 3-4x/week 30-45 min    Seat Belt Yes    Self-Exams Yes       Constitutional and vital signs reviewed.      Social History and Family History elements reviewed from today, pertinent positives to the presenting problem noted.    Physical Exam     ED Triage Vitals [01/29/24 1803]   /63   Pulse 77   Resp 16   Temp 97.7 °F (36.5 °C)   Temp src    SpO2 95 %   O2 Device None (Room air)       Physical Exam   Constitutional: AAOx3, well nourished, NAD  HEENT: Normocephalic, PERRLA, MMM  CV: s1s2+, RRR, no m/r/g, normal distal pulses  Pulmonary/Chest: CTA b/l with no rales, wheezes.  No chest wall tenderness  Abdominal: Nontender.  Nondistended. Soft. Bowel sounds are normal.   Neck/Back: Mild tenderness palpation in the left infrascapular area  :   Musculoskeletal: Normal range of motion. No deformity.   Neurological: Awake, alert. Normal reflexes. No cranial nerve deficit.    Skin: Skin is warm and dry. No rash noted. No erythema.   Psychiatric:      All measures to prevent infection transmission during my interaction with the patient were taken. The patient was already wearing a droplet mask on my  arrival to the room. Personal protective equipment was worn throughout the duration of the exam.      ED Course        Labs Reviewed   BASIC METABOLIC PANEL (8) - Normal   TROPONIN I HIGH SENSITIVITY - Normal   CBC WITH DIFFERENTIAL WITH PLATELET    Narrative:     The following orders were created for panel order CBC With Differential With Platelet.                  Procedure                               Abnormality         Status                                     ---------                               -----------         ------                                     CBC W/ DIFFERENTIAL[[id number]]                              Final result                                                 Please view results for these tests on the individual orders.   RAINBOW DRAW LIGHT GREEN   RAINBOW DRAW LAVENDER   RAINBOW DRAW BLUE   CBC W/ DIFFERENTIAL     My Independent Interpretation of EKG (if performed): EKG    Rate, intervals and axes as noted on EKG Report.  Rate: 72 bpm  Rhythm: Sinus Rhythm  Reading: Normal sinus rhythm, no acute ST changes, normal axis and intervals, no ectopy             Monitor Interpretation:   normal sinus rhythm as interpreted by me.      Imaging Results Available and Reviewed while in ED: XR CHEST PA + LAT CHEST (CPT=71046)    Result Date: 1/29/2024  CONCLUSION:  1. No acute airspace disease.  Previously seen patchy right-sided airspace disease has resolved.  Minimal right basal scarring/atelectasis.  Stable moderate eventration right hemidiaphragm.    Dictated by (CST): Roger Browning MD on 1/29/2024 at 4:58 PM     Finalized by (CST): Roger Browning MD on 1/29/2024 at 5:00 PM         ED Medications Administered: Medications - No data to display          MDM     Vitals:    01/29/24 1803 01/29/24 1915   BP: 145/63    Pulse: 77 66   Resp: 16 13   Temp: 97.7 °F (36.5 °C)    SpO2: 95% 99%   Weight: 95.3 kg    Height: 149.9 cm (4' 11\")      *I personally reviewed and interpreted all ED  vitals.    Independent Interpretation of Studies: I have independently reviewed patient's chest x-ray completed at urgent care.  Patient does not have any significant acute changes    Social Determinants of Health:     Procedures:      Differential/MDM/Shared Decision Making: Differential Diagnosis includes differential diagnosis includes muscular strain, ACS, PE, dissection, pneumothorax, pleural effusion, others.      The patient already has high blood pressure, obesity, prediabetes, anxiety, to contribute to the complexity of this ED evaluation.           Patient has a heart score of 3.  Repeat troponin in the ED is negative.  Patient has no PE risk factors and has normal room air oxygen saturation with normal pulse.  Discussed case with patient and I explained that I believe her symptoms are muscular in origin from infrascapular musculature.  She is comfortable discharge on NSAIDs.      Condition upon leaving the department: Stable    Disposition and Plan     Clinical Impression:  1. Strain of thoracic back region        Disposition:  Discharge    Follow-up:  Sherrie Rooney MD  [address] [facility]  [address] [place] [id number] [phone]    Call in 2 day(s)        Medications Prescribed:  Current Discharge Medication List

## 2024-01-30 NOTE — ED QUICK NOTES
Dr. Weiss at bedside for reassessment and update on treatment plan/test results.  Awaiting dispo

## 2024-05-06 ENCOUNTER — HOSPITAL ENCOUNTER (OUTPATIENT)
Age: 59
Discharge: HOME | End: 2024-05-06
Payer: COMMERCIAL

## 2024-05-06 VITALS
HEART RATE: 81 BPM | DIASTOLIC BLOOD PRESSURE: 64 MMHG | OXYGEN SATURATION: 99 % | RESPIRATION RATE: 16 BRPM | SYSTOLIC BLOOD PRESSURE: 141 MMHG | TEMPERATURE: 98 F

## 2024-05-06 DIAGNOSIS — H65.192 ACUTE OTITIS MEDIA WITH EFFUSION OF LEFT EAR: Primary | ICD-10-CM

## 2024-05-06 PROCEDURE — 99213 OFFICE O/P EST LOW 20 MIN: CPT | Performed by: PHYSICIAN ASSISTANT

## 2024-05-06 RX ORDER — AZITHROMYCIN 250 MG/1
TABLET, FILM COATED ORAL
Qty: 6 TABLET | Refills: 0 | Status: SHIPPED | OUTPATIENT
Start: 2024-05-06 | End: 2024-05-11

## 2024-05-06 NOTE — ED PROVIDER NOTES
Patient Seen in: [facility]      History     Chief Complaint   Patient presents with    Ear Problem Pain     Stated Complaint: Ear Problem    Subjective:   HPI    Patient is a 58-year-old female with history below, presenting to immediate care for evaluation of left ear discomfort since yesterday.  Associated slight nasal congestion, postnasal drip, and left-sided pressure.  Suspects underlying allergies.  Coming to immediate care for concern for possible ear infection.  Denies any fevers.  No URI symptoms.  No hearing loss, ringing in ears, ear swelling, rash, or ear drainage.    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   Tobacco Use    Smoking status: Never    Smokeless tobacco: Never   Vaping Use    Vaping status: Never Used   Substance and Sexual Activity    Alcohol use: Yes     Comment: occ    Drug use: No    Sexual activity: Yes     Partners: Male     Birth control/protection: Vasectomy   Other Topics Concern     Service No    Blood Transfusions No    Caffeine Concern Yes     Comment: COFFEE 1 CUP EVERY OTHER DAY    Occupational Exposure No    Hobby Hazards No    Sleep Concern No    Stress Concern No    Weight Concern Yes    Special Diet Yes    Exercise Yes     Comment: treadmill 3-4x/week 30-45 min    Seat Belt Yes    Self-Exams Yes   Social History Narrative    Lives with  and 2 girls                Review of Systems   Constitutional:  Negative for chills and fever.   HENT:  Positive for congestion, ear pain and postnasal drip. Negative for facial swelling, sinus pressure, sinus pain and sore throat.    Respiratory:  Negative for cough.    Musculoskeletal:  Negative for neck pain and neck stiffness.   Allergic/Immunologic: Negative for immunocompromised state.   Neurological:  Negative for dizziness, light-headedness and headaches.   Psychiatric/Behavioral:  Negative for confusion.    All other systems reviewed and are negative.      Positive for stated complaint: Ear Problem  Other systems are as noted in HPI.  Constitutional and vital signs reviewed.      All other systems reviewed and negative except as noted above.    Physical Exam     ED Triage Vitals [05/06/24 1628]   /64   Pulse 81   Resp 16   Temp 98.4 °F (36.9 °C)   Temp src Temporal   SpO2 99 %   O2 Device None (Room air)       Current:/64   Pulse 81   Temp 98.4 °F (36.9 °C) (Temporal)   Resp 16   LMP 01/14/2019 (Approximate)   SpO2 99%         Physical Exam  Vitals and nursing note reviewed.   Constitutional:       General: She is not in acute distress.     Appearance: Normal appearance. She is not ill-appearing, toxic-appearing or diaphoretic.   HENT:      Head: Normocephalic and atraumatic.      Right Ear: Tympanic membrane normal.      Left Ear: Tympanic membrane normal.      Ears:      Comments: Bilateral ear effusion.  Left TM erythematous with effusion and bulging TM.     Nose: Congestion present.      Comments: Enlarged nasal turbinates.     Mouth/Throat:      Mouth: Mucous membranes are moist.      Comments: Postnasal drip.  Eyes:      Conjunctiva/sclera: Conjunctivae normal.   Cardiovascular:      Rate and Rhythm: Normal rate.      Pulses: Normal pulses.   Pulmonary:      Effort: Pulmonary effort is normal. No respiratory distress.      Breath sounds: Normal breath sounds. No wheezing, rhonchi or rales.    Musculoskeletal:         General: No deformity. Normal range of motion.   Neurological:      General: No focal deficit present.      Mental Status: She is alert and oriented to person, place, and time.      Motor: No weakness.      Gait: Gait normal.   Psychiatric:         Mood and Affect: Mood normal.         Behavior: Behavior normal.             ED Course   Labs Reviewed - No data to display         MDM     Dx: Acute otitis media with effusion, left, initial encounter  Overall well-appearing  Hemodynamically stable  Afebrile  Tolerating PO  Outpatient management  Supportive care  Rest  Oral hydration  OTC Motrin/Tylenol as needed for pain/fever/otalgia  Flonase and zyrtec for allergies/congestion  Rx azithromycin for acute otitis media  PCP follow  Return precaution  Discharge instructions otitis media                                     Medical Decision Making      Disposition and Plan     Clinical Impression:  1. Acute otitis media with effusion of left ear         Disposition:  Discharge  5/6/2024  4:43 pm    Follow-up:  Sherrie Rooney MD  [address]  [place] [address] [phone]          Bethel Bhatt MD  [address] [facility] [facility] [address]  [facility] [address] [phone]      ENT (Ear Nose and Throat ) Doctor, As needed          Medications Prescribed:  Current Discharge Medication List        START taking these medications    Details   azithromycin (ZITHROMAX Z-CORA) 250 MG Oral Tab 500 mg once followed by 250 mg daily x 4 days  Qty: 6 tablet, Refills: 0

## 2024-12-17 ENCOUNTER — HOSPITAL ENCOUNTER (OUTPATIENT)
Age: 59
Discharge: HOME | End: 2024-12-17
Payer: COMMERCIAL

## 2024-12-17 ENCOUNTER — APPOINTMENT (OUTPATIENT)
Dept: GENERAL RADIOLOGY | Age: 59
End: 2024-12-17
Attending: NURSE PRACTITIONER
Payer: COMMERCIAL

## 2024-12-17 VITALS
TEMPERATURE: 99 F | SYSTOLIC BLOOD PRESSURE: 156 MMHG | DIASTOLIC BLOOD PRESSURE: 67 MMHG | RESPIRATION RATE: 16 BRPM | OXYGEN SATURATION: 100 % | HEART RATE: 78 BPM

## 2024-12-17 DIAGNOSIS — B97.89 VIRAL RESPIRATORY ILLNESS: Primary | ICD-10-CM

## 2024-12-17 DIAGNOSIS — J98.8 VIRAL RESPIRATORY ILLNESS: Primary | ICD-10-CM

## 2024-12-17 LAB — S PYO AG THROAT QL: NEGATIVE

## 2024-12-17 PROCEDURE — 87880 STREP A ASSAY W/OPTIC: CPT | Performed by: NURSE PRACTITIONER

## 2024-12-17 PROCEDURE — 99213 OFFICE O/P EST LOW 20 MIN: CPT | Performed by: NURSE PRACTITIONER

## 2024-12-17 PROCEDURE — 71046 X-RAY EXAM CHEST 2 VIEWS: CPT | Performed by: NURSE PRACTITIONER

## 2024-12-17 NOTE — ED PROVIDER NOTES
Patient Seen in: [facility]    History   CC: sore throat  HPI: René Perdomo 59 year old female with history of tonsillectomy who presents c/o sore throat, right side worse than left, cough, some right ear pain and right-sided neck pain. Pain is constant in nature, denies injury/trauma, fever, rash, darlyn, cp. States similar s/s 1yr ago and was dx with Pneumonia. Denies gi s/s. Denies exacerbating/relieving factors.      Past Medical History:    Anxiety state    Essential hypertension    GI DISORDER    gallstones    High blood pressure    PONV (postoperative nausea and vomiting)    Prediabetes    Sinusitis    Visual impairment    eyeglasses       Past Surgical History:   Procedure Laterality Date      , 2001    x2    Cholecystectomy  2012    Colonoscopy      Other surgical history      lipoma on back    Other surgical history      bunionectomy x 2 -5th toes    Tonsillectomy         Family History   Problem Relation Age of Onset    Hypertension Father     Diabetes Father     Heart Disorder Father         chf    Diabetes Mother     Hypertension Mother     Migraines Mother     Hypertension Sister     Lipids Sister     Diabetes Sister 46    Cancer Sister 45        Cancer on arm, type of skin cancer    Hypertension Sister     Heart Disorder Maternal Grandmother     Hypertension Maternal Grandmother        Social History     Socioeconomic History    Marital status:    Occupational History    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   Tobacco Use    Smoking status: Never    Smokeless tobacco: Never   Vaping Use    Vaping status: Never Used   Substance and Sexual Activity    Alcohol use: Yes     Comment: occ    Drug use: No    Sexual activity: Yes     Partners: Male     Birth control/protection: Vasectomy   Other Topics Concern     Service No    Blood Transfusions No    Caffeine Concern Yes     Comment: COFFEE 1 CUP EVERY OTHER DAY    Occupational Exposure No    Hobby Hazards No    Sleep  Concern No    Stress Concern No    Weight Concern Yes    Special Diet Yes    Exercise Yes     Comment: treadmill 3-4x/week 30-45 min    Seat Belt Yes    Self-Exams Yes   Social History Narrative    Lives with  and 2 girls        ROS:  Systems reviewed: All pertinent positives noted in HPI. Unless otherwise noted, additional systems reviewed are negative.   Vital signs reviewed.    Positive for stated complaint: Sore Throat  Other systems are as noted in HPI.  Constitutional and vital signs reviewed.      All other systems reviewed and negative except as noted above.    PSFH elements reviewed from today and agreed except as otherwise stated in HPI.             Constitutional and vital signs reviewed.        Physical Exam     ED Triage Vitals [12/17/24 1241]   /67   Pulse 78   Resp 16   Temp 98.9 °F (37.2 °C)   Temp src Oral   SpO2 100 %   O2 Device None (Room air)       Current:/67   Pulse 78   Temp 98.9 °F (37.2 °C) (Oral)   Resp 16   LMP 01/14/2019 (Approximate)   SpO2 100%         PE:  General - Appears well, non-toxic and in NAD  Head - Appears symmetrical without deformity/swelling cranium, scalp, or facial bones  Eyes - sclera not injected, no discharge noted, no periorbital edema  ENT - EAC bilaterally without discharge, TM pearly grey with COL visualized appropriately bilaterally.   no nasal drainage noted in nares bilat, no cobblestoning to post. Pharynx.   Oropharynx clear, posterior pharynx is mildly erythematous however without exudate or visualized tonsils, uvula midline, +gag, voice is clear. No trismus  Neck - + bilateral anterior cervical lymphadenopathy however right is greater than left, supple with trachea midline  No bruit bilat  Resp - Lung sounds clear bilaterally and wob unlabored, good aeration with equal, even expansion bilaterally   CV - RRR  Skin - no rashes or petechiae noted, pink warm and dry throughout, mmm, cap refill <2seconds  Neuro - A&O x4, steady gait  MSK  - makes purposeful movements of all extremities, radial pulses 2+ bilat.  Psych - Interactive and appropriate      ED Course     Labs Reviewed   POCT RAPID STREP - Normal       MDM     XR CHEST PA + LAT CHEST (CPT=71046)   Final Result   PROCEDURE: XR CHEST PA + LAT CHEST (CPT=71046)       COMPARISON: XR CHEST PA + LAT CHEST (CPT=71046), 1/29/2024, 4:48 PM.       INDICATIONS: Acute cough.       TECHNIQUE:   Two views.         FINDINGS:    CARDIAC/VASC: The cardiomediastinal silhouette is within normal limits of    size.   MEDIAST/SERGEI: No visible mass or adenopathy.    LUNGS/PLEURA: Redemonstrated elevation of the right hemidiaphragm with    associated minimal right basilar opacity, which is not significantly    changed.  No new focal opacity.  No pleural effusion.  No pneumothorax.   BONES: Mild dextrocurvature of the thoracic spine.  Multilevel    degenerative changes of the thoracic spine.   OTHER: There are surgical clips in the right upper quadrant of the    abdomen.                   =====   CONCLUSION:        No new focal opacity, pleural effusion, pneumothorax.       Redemonstrated elevation of the right hemidiaphragm with associated    minimal right basilar atelectasis/scarring.                 Dictated by (CST): Danny Diego MD on 12/17/2024 at 1:17 PM        Finalized by (CST): Danny Diego MD on 12/17/2024 at 1:19 PM                   DDx: Strep pharyngitis, retropharyngeal abscess,    Rapid strep negative.  Offered COVID testing which patient declines.  Is requesting x-ray due to history of pneumonia.  Chest x-ray as noted above and independently reviewed by this provider without pneumonia or acute process.  Results discussed with patient as well as general viral illness instructions, rest, hydration instructions, Tylenol or Motrin as needed for discomfort, warm beverages, warm compresses for adenopathy, follow-up and return/ED precautions reviewed. Patient is historian and demonstrates  understanding of all instruction and agrees with plan of care.      Disposition and Plan     Clinical Impression:  1. Viral respiratory illness        Disposition:  Discharge    Follow-up:  Sherrie Rooney MD  [address] [place] [address] [phone]    Go in 1 week  As needed      Medications Prescribed:  Current Discharge Medication List

## 2024-12-17 NOTE — ED INITIAL ASSESSMENT (HPI)
Patient is here with right sided throat pain,  right ear pain and she states it is going down to her chest.   She states she has a slight cough.

## 2025-04-10 ENCOUNTER — APPOINTMENT (OUTPATIENT)
Dept: GENERAL RADIOLOGY | Age: 60
End: 2025-04-10
Attending: NURSE PRACTITIONER
Payer: COMMERCIAL

## 2025-04-10 ENCOUNTER — HOSPITAL ENCOUNTER (OUTPATIENT)
Age: 60
Discharge: HOME | End: 2025-04-10
Payer: COMMERCIAL

## 2025-04-10 VITALS
OXYGEN SATURATION: 98 % | SYSTOLIC BLOOD PRESSURE: 151 MMHG | RESPIRATION RATE: 16 BRPM | HEART RATE: 72 BPM | DIASTOLIC BLOOD PRESSURE: 73 MMHG | TEMPERATURE: 98 F

## 2025-04-10 DIAGNOSIS — J40 BRONCHITIS: ICD-10-CM

## 2025-04-10 DIAGNOSIS — R05.1 ACUTE COUGH: Primary | ICD-10-CM

## 2025-04-10 PROCEDURE — 99213 OFFICE O/P EST LOW 20 MIN: CPT | Performed by: NURSE PRACTITIONER

## 2025-04-10 PROCEDURE — 71046 X-RAY EXAM CHEST 2 VIEWS: CPT | Performed by: NURSE PRACTITIONER

## 2025-04-10 RX ORDER — ALBUTEROL SULFATE 90 UG/1
2 INHALANT RESPIRATORY (INHALATION) EVERY 4 HOURS PRN
Qty: 1 EACH | Refills: 0 | Status: SHIPPED | OUTPATIENT
Start: 2025-04-10 | End: 2025-05-10

## 2025-04-10 RX ORDER — BENZONATATE 100 MG/1
100 CAPSULE ORAL 3 TIMES DAILY PRN
Qty: 30 CAPSULE | Refills: 0 | Status: SHIPPED | OUTPATIENT
Start: 2025-04-10 | End: 2025-05-10

## 2025-04-10 NOTE — DISCHARGE INSTRUCTIONS
For cough can take albuterol inhaler every 4-6 hours as needed while awake for cough. If makes cough worsen stop taking.     Tessalon pearles>to help suppress cough    Dayquil/nyquil for daytime and night time symptoms.       Cough, Adult  Coughing is a reflex that clears your throat and airways (respiratory system). It helps heal and protect your lungs. It is normal to cough from time to time. A cough that happens with other symptoms or that lasts a long time may be a sign of a condition that needs treatment. A short-term (acute) cough may only last 2-3 weeks. A long-term (chronic) cough may last 8 or more weeks.    Coughing is often caused by:  Diseases, such as:  An infection of the respiratory system.  Asthma or other heart or lung diseases.  Gastroesophageal reflux. This is when acid comes back up from the stomach.  Breathing in things that irritate your lungs.  Allergies.  Postnasal drip. This is when mucus runs down the back of your throat.  Smoking.  Some medicines.  Follow these instructions at home:  Medicines    Take over-the-counter and prescription medicines only as told by your health care provider.  Talk with your provider before you take cough medicine (cough suppressants).  Eating and drinking    Do not drink alcohol.  Avoid caffeine.  Drink enough fluid to keep your pee (urine) pale yellow.  Lifestyle    Avoid cigarette smoke.  Do not use any products that contain nicotine or tobacco. These products include cigarettes, chewing tobacco, and vaping devices, such as e-cigarettes. If you need help quitting, ask your provider.  Avoid things that make you cough. These may include perfumes, candles, cleaning products, or campfire smoke.  General instructions    A person holding a cloth over the mouth and nose while coughing.  Watch for any changes to your cough. Tell your provider about them.  Always cover your mouth when you cough.  If the air is dry in your bedroom or home, use a cool mist vaporizer or  humidifier.  If your cough is worse at night, try to sleep in a semi-upright position.  Rest as needed.  Contact a health care provider if:  You have new symptoms, or your symptoms get worse.  You cough up pus.  You have a fever that does not go away or a cough that does not get better after 2-3 weeks.  You cannot control your cough with medicine, and you are losing sleep.  You have pain that gets worse or is not helped with medicine.  You lose weight for no clear reason.  You have night sweats.  Get help right away if:  You cough up blood.  You have trouble breathing.  Your heart is beating very fast.

## 2025-04-10 NOTE — ED INITIAL ASSESSMENT (HPI)
Onset last tues w/ fever max 101. Developed a cough both dry and productive. Here for eval of her chest congestion and coughing fits.   No sob.  Otc meds - dayquil/nightquil

## 2025-04-10 NOTE — ED PROVIDER NOTES
Patient Seen in: [facility]      History     Chief Complaint   Patient presents with    Cough/URI     Stated Complaint: cough    Subjective:   HPI      59 year oldfemale pw cough, started last week, now constant. Intermittently productive. +postnasal drip.   Denies f/c/n/v/d. No recent sick exposures. Denies recent infections/covid exposures.  Initially w/fever, but now afebrile.     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   Tobacco Use    Smoking status: Never    Smokeless tobacco: Never   Vaping Use    Vaping status: Never Used   Substance and Sexual Activity    Alcohol use: Yes     Comment: occ    Drug use: No    Sexual activity: Yes     Partners: Male     Birth control/protection: Vasectomy   Other Topics Concern     Service No    Blood Transfusions No    Caffeine Concern Yes     Comment: COFFEE 1 CUP EVERY OTHER DAY    Occupational Exposure No    Hobby Hazards No    Sleep Concern No    Stress Concern No    Weight Concern Yes    Special Diet Yes    Exercise Yes     Comment: treadmill 3-4x/week 30-45 min    Seat Belt Yes    Self-Exams Yes   Social History Narrative    Lives with  and 2 girls      Social Drivers of Health      Received from [employer]    [facility]              Review of Systems    Positive for stated complaint: cough  Other systems are as noted in HPI.  Constitutional and  vital signs reviewed.      All other systems reviewed and negative except as noted above.    Physical Exam     ED Triage Vitals [04/10/25 1306]   /73   Pulse 72   Resp 16   Temp 98 °F (36.7 °C)   Temp src Oral   SpO2 98 %   O2 Device None (Room air)       Current Vitals:   Vital Signs  BP: 151/73  Pulse: 72  Resp: 16  Temp: 98 °F (36.7 °C)  Temp src: Oral    Oxygen Therapy  SpO2: 98 %  O2 Device: None (Room air)        Physical Exam  Vitals and nursing note reviewed.   Constitutional:       General: She is not in acute distress.     Appearance: She is not toxic-appearing.   HENT:      Head: Normocephalic.      Nose: Nose normal. No congestion or rhinorrhea.      Mouth/Throat:      Mouth: Mucous membranes are moist.   Pulmonary:      Effort: Pulmonary effort is normal. No respiratory distress.      Breath sounds: Rhonchi present.   Abdominal:      General: Abdomen is flat.   Musculoskeletal:         General: Normal range of motion.      Cervical back: Normal range of motion.   Skin:     General: Skin is warm and dry.      Capillary Refill: Capillary refill takes less than 2 seconds.   Neurological:      General: No focal deficit present.      Mental Status: She is alert.             ED Course   Labs Reviewed - No data to display    ED Course as of 04/10/25 1444  ------------------------------------------------------------  Time: 04/10 1417  Value: XR CHEST PA + LAT CHEST (OCJ=83133)  Comment: Slight increased bronchial wall thickening within the bilateral perihilar regions which could represent bronchitis.  There is no dense consolidation              MDM            Medical Decision Making  59 year old female with here for cough, URI, PNA . Well-appearing, well-hydrated on exam. No SBI identified on exam. Likely viral illness, URI.  Xray of chest >I have directly viewed this exam,  No pna as clinically questioned. Pending rad read.     Xray>XR CHEST PA + LAT CHEST (CPT=71046)  Result Date:  4/10/2025  CONCLUSION: Slight increased bronchial wall thickening within the bilateral perihilar regions which could represent bronchitis.  There is no dense consolidation.    Dictated by (CST): Neri Barbosa MD on 4/10/2025 at 2:11 PM     Finalized by (CST): Neri Barbosa MD on 4/10/2025 at 2:11 PM             Plan:   - home with supportive care  - tylenol, motrin prn  - f/u with PCP    Physical exam remained stable over serial reexaminations as previously documented. External chart review completed. No recent hospitalizations for the same.      I have discussed with the patient the results of tests, differential diagnosis, and warning signs and symptoms that should prompt immediate return.  The patient understands these instructions and agrees to the follow-up plan provided.  There are no barriers to learning.   Appropriate f/u given.  Patient agrees to return for any concerns/problems/complications.    Differential diagnosis reflecting the complexity of care include: URI, PNA, viral pharyngitis, rhinitis, sinusitis    Comorbidities that add complexity to management include:HTN    External chart review was done and was noted:Yes    History obtained by an independent source was from: Patient    Discussions of management was done with:Patient    My independent interpretation of studies of:Xray    Diagnostic tests and medications considered but not ordered were:na    Social determinants of health that affect care:NA    Shared decision making was done by Self, Patient           Disposition and Plan     Clinical Impression:  1. Acute cough    2. Bronchitis         Disposition:  Discharge  4/10/2025  2:21 pm    Follow-up:  Sherrie Rooney MD  [facility] [place] [address]  [phone]                Medications Prescribed:  Discharge Medication List as of 4/10/2025  2:30 PM        START taking these medications    Details   albuterol 108 (90 Base) MCG/ACT Inhalation Aero Soln Inhale 2  puffs into the lungs every 4 (four) hours as needed for Wheezing., Normal, Disp-1 each, R-0      Spacer/Aero-Hold Chamber Mask Does not apply Misc Use with inhaler, Normal, Disp-1 each, R-0      benzonatate 100 MG Oral Cap Take 1 capsule (100 mg total) by mouth 3 (three) times daily as needed for cough., Normal, Disp-30 capsule, R-0                 Supplementary Documentation:

## (undated) DEVICE — SET TUBI Y FL CNTRL INFL/OTFL

## (undated) DEVICE — MYOSURE SINGLE USE SEAL SET

## (undated) DEVICE — SOCK CNSTR 4IN TNPSL UNV SPEC

## (undated) DEVICE — HYSTEROSCOPY: Brand: MEDLINE INDUSTRIES, INC.

## (undated) DEVICE — SOL  .9 3000ML

## (undated) DEVICE — ENCORE® LATEX MICRO SIZE 6.5, STERILE LATEX POWDER-FREE SURGICAL GLOVE: Brand: ENCORE

## (undated) DEVICE — MATTRESS PT HOVERMATT 1/2L 34W

## (undated) DEVICE — ENCORE® LATEX ACCLAIM SIZE 6, STERILE LATEX POWDER-FREE SURGICAL GLOVE: Brand: ENCORE

## (undated) NOTE — LETTER
Date & Time: 1/10/2023, 9:45 AM  Patient: Nakia Meyers  Encounter Provider(s):    BHUPINDER Beach       To Whom It May Concern:    Nakia Meyers was seen and treated in our department on 1/10/2023. She can return to work on 1/11/23.     If you have any questions or concerns, please do not hesitate to call.        _____________________________  Physician/APC Signature

## (undated) NOTE — LETTER
Date & Time: 11/17/2023, 3:44 PM  Patient: Yong Serrano  Encounter Provider(s):    Matty Horne PA-C       To Whom It May Concern:    Yong Serrano was seen and treated in our department on 11/17/2023. She can return to work 11/20/2023 .     If you have any questions or concerns, please do not hesitate to call.        _____________________________  Physician/APC Signature

## (undated) NOTE — LETTER
Date & Time: 11/15/2023, 11:11 AM  Patient: Elvira Bernard  Encounter Provider(s):    BHUPINDER Patel       To Whom It May Concern:    Elvira Bernard was seen and treated in our department on 11/15/2023. She should not return to work until Friday as long as she remains fever free without fever reducing meds. Will be off today and tomorrow .     BHUPINDER Cardoso, 11/15/23, 11:12 AM

## (undated) NOTE — LETTER
Date & Time: 1/11/2023, 9:42 AM  Patient: Irena Lafleur  Encounter Provider(s):    BHUPINDER Guerra       To Whom It May Concern:    Irena Lafleur was seen and treated in our department on 1/10/2023. She can return to work on 1/12/23.     If you have any questions or concerns, please do not hesitate to call.        _____________________________  Physician/APC Signature